# Patient Record
Sex: MALE | Race: OTHER | Employment: FULL TIME | URBAN - METROPOLITAN AREA
[De-identification: names, ages, dates, MRNs, and addresses within clinical notes are randomized per-mention and may not be internally consistent; named-entity substitution may affect disease eponyms.]

---

## 2018-03-03 ENCOUNTER — APPOINTMENT (OUTPATIENT)
Dept: GENERAL RADIOLOGY | Age: 28
End: 2018-03-03
Attending: EMERGENCY MEDICINE
Payer: COMMERCIAL

## 2018-03-03 ENCOUNTER — HOSPITAL ENCOUNTER (EMERGENCY)
Age: 28
Discharge: HOME | End: 2018-03-03
Attending: EMERGENCY MEDICINE
Payer: COMMERCIAL

## 2018-03-03 VITALS
RESPIRATION RATE: 16 BRPM | SYSTOLIC BLOOD PRESSURE: 132 MMHG | BODY MASS INDEX: 25.18 KG/M2 | HEART RATE: 60 BPM | HEIGHT: 69 IN | DIASTOLIC BLOOD PRESSURE: 63 MMHG | OXYGEN SATURATION: 100 % | WEIGHT: 170 LBS | TEMPERATURE: 98.3 F

## 2018-03-03 DIAGNOSIS — M62.838 MUSCLE SPASM: Primary | ICD-10-CM
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
## 2018-03-03 PROCEDURE — 72040 X-RAY EXAM NECK SPINE 2-3 VW: CPT

## 2018-03-03 PROCEDURE — 99282 EMERGENCY DEPT VISIT SF MDM: CPT | Performed by: EMERGENCY MEDICINE

## 2018-03-03 RX ORDER — METHOCARBAMOL 500 MG/1
500 TABLET, FILM COATED ORAL 2 TIMES DAILY
Qty: 10 TAB | Refills: 0 | Status: SHIPPED | OUTPATIENT
Start: 2018-03-03 | End: 2018-03-08

## 2018-03-03 NOTE — ED NOTES
I have reviewed discharge instructions with the patient. The patient verbalized understanding. Patient left ED via Discharge Method: ambulatory to Home with self. Opportunity for questions and clarification provided. Patient given 1 scripts. To continue your aftercare when you leave the hospital, you may receive an automated call from our care team to check in on how you are doing. This is a free service and part of our promise to provide the best care and service to meet your aftercare needs.  If you have questions, or wish to unsubscribe from this service please call [phone]. Thank you for Choosing our [facility] Emergency Department.

## 2018-03-03 NOTE — LETTER
[facility] EMERGENCY DEPT 
[address] [place] [id number] 
[phone] Work/School Note Date: 3/3/2018 To Whom It May concern: 
 
James Hicks was seen and treated today in the emergency room by the following provider(s): 
Attending Provider: Carie Salcedo MD. James Hicks may return to work on [id number]. Sincerely, Rolf Johnson RN
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
## 2018-03-06 ENCOUNTER — HOSPITAL ENCOUNTER (EMERGENCY)
Age: 28
Discharge: HOME | End: 2018-03-06
Attending: EMERGENCY MEDICINE
Payer: COMMERCIAL

## 2018-03-06 VITALS
OXYGEN SATURATION: 100 % | SYSTOLIC BLOOD PRESSURE: 151 MMHG | WEIGHT: 170 LBS | HEIGHT: 69 IN | HEART RATE: 96 BPM | TEMPERATURE: 98 F | RESPIRATION RATE: 14 BRPM | BODY MASS INDEX: 25.18 KG/M2 | DIASTOLIC BLOOD PRESSURE: 79 MMHG
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
## 2018-03-06 PROCEDURE — 99282 EMERGENCY DEPT VISIT SF MDM: CPT | Performed by: PHYSICIAN ASSISTANT
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
## 2018-03-06 NOTE — ED TRIAGE NOTES
Patient states needs a note for a week off of work at least as his job requires him to lift heavy things which are exacerbating his neck pain which he was seen here back on 3/3.

## 2018-03-06 NOTE — LETTER
[facility] EMERGENCY DEPT 
[address] [place] [id number] [phone] Work/School Note Date: 3/6/2018 To Whom It May concern: 
 
Hazel Soulier was seen and treated today in the emergency room by the following provider(s): 
Attending Provider: Cherelle Brooke MD 
Physician Assistant: ML Philippe. Hazel Soulier may return to work on 3-7-18. Sincerely, ML Philippe

## 2019-11-30 ENCOUNTER — HOSPITAL ENCOUNTER (EMERGENCY)
Age: 29
Discharge: HOME | End: 2019-11-30
Attending: EMERGENCY MEDICINE
Payer: COMMERCIAL

## 2019-11-30 VITALS
HEIGHT: 69 IN | HEART RATE: 64 BPM | TEMPERATURE: 98.5 F | SYSTOLIC BLOOD PRESSURE: 115 MMHG | OXYGEN SATURATION: 98 % | RESPIRATION RATE: 18 BRPM | DIASTOLIC BLOOD PRESSURE: 75 MMHG | WEIGHT: 170 LBS | BODY MASS INDEX: 25.18 KG/M2

## 2019-11-30 DIAGNOSIS — F41.1 ANXIETY STATE: Primary | ICD-10-CM

## 2019-11-30 LAB
ANION GAP SERPL CALC-SCNC: 7 MMOL/L (ref 7–16)
BASOPHILS # BLD: 0.1 K/UL (ref 0–0.2)
BASOPHILS NFR BLD: 1 % (ref 0–2)
BUN SERPL-MCNC: 14 MG/DL (ref 6–23)
CALCIUM SERPL-MCNC: 9.8 MG/DL (ref 8.3–10.4)
CHLORIDE SERPL-SCNC: 105 MMOL/L (ref 98–107)
CO2 SERPL-SCNC: 27 MMOL/L (ref 21–32)
CREAT SERPL-MCNC: 0.85 MG/DL (ref 0.8–1.5)
DIFFERENTIAL METHOD BLD: ABNORMAL
EOSINOPHIL # BLD: 0.1 K/UL (ref 0–0.8)
EOSINOPHIL NFR BLD: 1 % (ref 0.5–7.8)
ERYTHROCYTE [DISTWIDTH] IN BLOOD BY AUTOMATED COUNT: 12.1 % (ref 11.9–14.6)
GLUCOSE SERPL-MCNC: 110 MG/DL (ref 65–100)
HCT VFR BLD AUTO: 40.4 % (ref 41.1–50.3)
HGB BLD-MCNC: 13.5 G/DL (ref 13.6–17.2)
IMM GRANULOCYTES # BLD AUTO: 0 K/UL (ref 0–0.5)
IMM GRANULOCYTES NFR BLD AUTO: 0 % (ref 0–5)
LYMPHOCYTES # BLD: 0.9 K/UL (ref 0.5–4.6)
LYMPHOCYTES NFR BLD: 10 % (ref 13–44)
MAGNESIUM SERPL-MCNC: 2.1 MG/DL (ref 1.8–2.4)
MCH RBC QN AUTO: 28 PG (ref 26.1–32.9)
MCHC RBC AUTO-ENTMCNC: 33.4 G/DL (ref 31.4–35)
MCV RBC AUTO: 83.8 FL (ref 79.6–97.8)
MONOCYTES # BLD: 0.3 K/UL (ref 0.1–1.3)
MONOCYTES NFR BLD: 4 % (ref 4–12)
NEUTS SEG # BLD: 7.4 K/UL (ref 1.7–8.2)
NEUTS SEG NFR BLD: 85 % (ref 43–78)
NRBC # BLD: 0 K/UL (ref 0–0.2)
PLATELET # BLD AUTO: 299 K/UL (ref 150–450)
PMV BLD AUTO: 10.7 FL (ref 9.4–12.3)
POTASSIUM SERPL-SCNC: 4 MMOL/L (ref 3.5–5.1)
PROLACTIN SERPL-MCNC: 7.6 NG/ML
RBC # BLD AUTO: 4.82 M/UL (ref 4.23–5.6)
SODIUM SERPL-SCNC: 139 MMOL/L (ref 136–145)
TSH SERPL DL<=0.005 MIU/L-ACNC: 1.36 UIU/ML
WBC # BLD AUTO: 8.7 K/UL (ref 4.3–11.1)

## 2019-11-30 PROCEDURE — 84146 ASSAY OF PROLACTIN: CPT

## 2019-11-30 PROCEDURE — 99283 EMERGENCY DEPT VISIT LOW MDM: CPT | Performed by: EMERGENCY MEDICINE

## 2019-11-30 PROCEDURE — 85025 COMPLETE CBC W/AUTO DIFF WBC: CPT

## 2019-11-30 PROCEDURE — 80048 BASIC METABOLIC PNL TOTAL CA: CPT

## 2019-11-30 PROCEDURE — 83735 ASSAY OF MAGNESIUM: CPT

## 2019-11-30 PROCEDURE — 84443 ASSAY THYROID STIM HORMONE: CPT

## 2019-11-30 RX ORDER — HYDROXYZINE PAMOATE 25 MG/1
25 CAPSULE ORAL
Qty: 25 CAP | Refills: 0 | Status: SHIPPED | OUTPATIENT
Start: 2019-11-30

## 2019-11-30 RX ORDER — CABERGOLINE 0.5 MG/1
0.25 TABLET ORAL 2 TIMES WEEKLY
COMMUNITY

## 2019-11-30 NOTE — DISCHARGE INSTRUCTIONS
As we discussed, it is important to follow-up with your doctor at the [place] as you may benefit from starting an antidepressant medication or from some cognitive behavioral therapy. In addition, they should be able to give you the results of your event monitor.

## 2019-11-30 NOTE — ED NOTES
I have reviewed discharge instructions with the patient. The patient verbalized understanding. Patient left ED via Discharge Method: ambulatory to Home with family. Opportunity for questions and clarification provided. Patient given 1 scripts. To continue your aftercare when you leave the hospital, you may receive an automated call from our care team to check in on how you are doing. This is a free service and part of our promise to provide the best care and service to meet your aftercare needs.  If you have questions, or wish to unsubscribe from this service please call [phone]. Thank you for Choosing our [facility] Emergency Department.

## 2019-11-30 NOTE — ED PROVIDER NOTES
Nael Grider is a 30 y.o. male seen on 11/30/2019 at 4:57 PM in the [facility] EMERGENCY DEPT in room ER01/01. Chief Complaint   Patient presents with    Other     HPI: 29-year-old male presenting to the emergency department for evaluation of feelings of anxiety. He states that beginning this summer he has had 3 or 4 episodes where he begins to feel short of breath and anxious. He will begin breathing very rapidly. He will have some cramping of his fingers and even forearm sometimes. He states he was feeling lightheaded as well during the episode today. He also has an associated heart racing sensation. He has been seen at the [place] for this. He is currently wearing an event monitor on his chest.  He did press the button today while he was having the heart racing sensation. He denies chest pain, no unilateral leg swelling or edema. The patient does report a history of pituitary gland tumor and is requesting that I recheck his prolactin level. I did explain to him this would not come back today but I was happy to send it and he could check it through my chart to tell his [place] doctor. Historian: patient    REVIEW OF SYSTEMS     Review of Systems   Constitutional: Negative for fatigue and fever. HENT: Negative. Eyes: Negative. Respiratory: Negative for cough, chest tightness, shortness of breath and wheezing. Cardiovascular: Negative for chest pain. Gastrointestinal: Negative for abdominal distention, abdominal pain, diarrhea, nausea and vomiting. Genitourinary: Negative for dysuria, flank pain, frequency, genital sores and urgency. Musculoskeletal: Negative. Skin: Negative for rash. Neurological: Negative for dizziness, syncope and headaches. Psychiatric/Behavioral: The patient is nervous/anxious. All other systems reviewed and are negative.       PAST MEDICAL HISTORY     Past Medical History:   Diagnosis Date    Ill-defined condition Pituitary tumor     Past Surgical History:   Procedure Laterality Date    HX HEENT       Social History     Socioeconomic History    Marital status: SINGLE     Spouse name: Not on file    Number of children: Not on file    Years of education: Not on file    Highest education level: Not on file   Tobacco Use    Smoking status: Never Smoker    Smokeless tobacco: Never Used   Substance and Sexual Activity    Alcohol use: No    Drug use: No     Prior to Admission Medications   Prescriptions Last Dose Informant Patient Reported? Taking?   cabergoline (DOSTINEX) 0.5 mg tablet   Yes Yes   Sig: Take 0.25 mg by mouth two (2) times a week. Facility-Administered Medications: None     No Known Allergies     PHYSICAL EXAM       Vitals:    11/30/19 1631   BP: 101/70   Pulse: 65   Resp: 16   Temp: 98.7 °F (37.1 °C)   SpO2: 97%    Vital signs were reviewed. Physical Exam  Vitals signs reviewed. Constitutional:       General: He is not in acute distress. Appearance: He is well-developed. He is not diaphoretic. HENT:      Head: Normocephalic and atraumatic. Eyes:      Pupils: Pupils are equal, round, and reactive to light. Neck:      Musculoskeletal: Normal range of motion and neck supple. Cardiovascular:      Rate and Rhythm: Normal rate and regular rhythm. Heart sounds: Normal heart sounds. No murmur. No friction rub. No gallop. Pulmonary:      Effort: Pulmonary effort is normal. No respiratory distress. Breath sounds: Normal breath sounds. No wheezing. Abdominal:      General: Bowel sounds are normal. There is no distension. Palpations: Abdomen is soft. There is no mass. Tenderness: There is no tenderness. There is no guarding or rebound. Musculoskeletal: Normal range of motion. General: No tenderness or deformity. Skin:     General: Skin is warm. Findings: No erythema or rash.    Neurological:      Mental Status: He is alert and oriented to person, place, and time. Sensory: No sensory deficit. Coordination: Coordination normal.      Comments: No focal deficits   Psychiatric:         Behavior: Behavior normal.         Thought Content: Thought content normal.          MEDICAL DECISION MAKING     MDM  Number of Diagnoses or Management Options     Amount and/or Complexity of Data Reviewed  Clinical lab tests: ordered and reviewed      Procedures    ED Course:    5:57 PM  Patient's labs are unremarkable, I do suspect that there is some component of anxiety going on. I have suggested that he call to follow-up with the [place] on the results of his monitor. He should also follow-up with them as he may benefit from going on an antidepressant to help control some of his anxiety symptoms. Disposition: Discharge home  Diagnosis: Anxiety  ____________________________________________________________________  A portion of this note was generated using voice recognition dictation software. While the note has been reviewed for accuracy, please note certain words and phrases may not be transcribed as intended and some grammatical and/or typographical errors may be present.

## 2019-11-30 NOTE — ED TRIAGE NOTES
Pt reports he is unsure if he is having A/D or if there is another cause for his symptoms of tingling in his hands, clammy hands and feet, persistant feelings of SOB.